# Patient Record
Sex: MALE | Race: WHITE | Employment: OTHER | URBAN - METROPOLITAN AREA
[De-identification: names, ages, dates, MRNs, and addresses within clinical notes are randomized per-mention and may not be internally consistent; named-entity substitution may affect disease eponyms.]

---

## 2022-07-27 ENCOUNTER — OFFICE VISIT (OUTPATIENT)
Dept: ENT CLINIC | Age: 72
End: 2022-07-27
Payer: MEDICARE

## 2022-07-27 VITALS
BODY MASS INDEX: 26.83 KG/M2 | DIASTOLIC BLOOD PRESSURE: 82 MMHG | HEIGHT: 68 IN | SYSTOLIC BLOOD PRESSURE: 134 MMHG | WEIGHT: 177 LBS

## 2022-07-27 DIAGNOSIS — H61.23 EXCESSIVE CERUMEN IN EAR CANAL, BILATERAL: ICD-10-CM

## 2022-07-27 DIAGNOSIS — R09.89 GLOBUS SENSATION: ICD-10-CM

## 2022-07-27 DIAGNOSIS — R05.9 COUGH: ICD-10-CM

## 2022-07-27 DIAGNOSIS — H90.3 SENSORINEURAL HEARING LOSS (SNHL) OF BOTH EARS: Primary | ICD-10-CM

## 2022-07-27 PROCEDURE — 3017F COLORECTAL CA SCREEN DOC REV: CPT | Performed by: STUDENT IN AN ORGANIZED HEALTH CARE EDUCATION/TRAINING PROGRAM

## 2022-07-27 PROCEDURE — 31575 DIAGNOSTIC LARYNGOSCOPY: CPT | Performed by: STUDENT IN AN ORGANIZED HEALTH CARE EDUCATION/TRAINING PROGRAM

## 2022-07-27 PROCEDURE — 99214 OFFICE O/P EST MOD 30 MIN: CPT | Performed by: STUDENT IN AN ORGANIZED HEALTH CARE EDUCATION/TRAINING PROGRAM

## 2022-07-27 PROCEDURE — G8427 DOCREV CUR MEDS BY ELIG CLIN: HCPCS | Performed by: STUDENT IN AN ORGANIZED HEALTH CARE EDUCATION/TRAINING PROGRAM

## 2022-07-27 PROCEDURE — 1123F ACP DISCUSS/DSCN MKR DOCD: CPT | Performed by: STUDENT IN AN ORGANIZED HEALTH CARE EDUCATION/TRAINING PROGRAM

## 2022-07-27 PROCEDURE — 1036F TOBACCO NON-USER: CPT | Performed by: STUDENT IN AN ORGANIZED HEALTH CARE EDUCATION/TRAINING PROGRAM

## 2022-07-27 PROCEDURE — G8417 CALC BMI ABV UP PARAM F/U: HCPCS | Performed by: STUDENT IN AN ORGANIZED HEALTH CARE EDUCATION/TRAINING PROGRAM

## 2022-07-27 ASSESSMENT — ENCOUNTER SYMPTOMS
ABDOMINAL PAIN: 0
COUGH: 0
EYE DISCHARGE: 0

## 2022-07-27 NOTE — PROGRESS NOTES
[facility] Office Note    Patient: Nicole Crowell  MRN: [id number]  : 1950  Gender:  male  Vital Signs: /82   Ht 5' 8\" (1.727 m)   Wt 177 lb (80.3 kg)   BMI 26.91 kg/m²   Date: 2022    CC:   Chief Complaint   Patient presents with    Follow-up     Patient presents today for annual cerumen removal and ear exam . Patient states that he has done well since last OV , he is doing well with Hearing aids. HPI:  Nicole Crowell is a 80 y.o. male with bilateral sensorineural hearing loss. He wears hearing aids. His hearing aids work well for him. He had a respiratory infection about a month ago and since then has some globus sensation and sensation of mucus in his throat and chest that he cannot completely cough up. Past Medical History, Past Surgical History, Family history, Social History, and Medications were all reviewed with the patient today and updated as necessary. No Known Allergies  There is no problem list on file for this patient. Current Outpatient Medications   Medication Sig    valsartan-hydroCHLOROthiazide (DIOVAN-HCT) 80-12.5 MG per tablet Take 1 tablet by mouth daily     No current facility-administered medications for this visit. Past Medical History:   Diagnosis Date    Hypertension      Social History     Tobacco Use    Smoking status: Never    Smokeless tobacco: Never   Substance Use Topics    Alcohol use: Yes     Past Surgical History:   Procedure Laterality Date    OTHER SURGICAL HISTORY      Hernia Repair     History reviewed. No pertinent family history. ROS:    Review of Systems   Constitutional:  Negative for fever. HENT:  Negative for ear discharge and ear pain. Eyes:  Negative for discharge. Respiratory:  Negative for cough. Cardiovascular:  Negative for chest pain. Gastrointestinal:  Negative for abdominal pain. Endocrine: Negative for cold intolerance. Genitourinary:  Negative for difficulty urinating.    Musculoskeletal: Negative for neck pain. Skin:  Negative for rash. Allergic/Immunologic: Negative for environmental allergies. Neurological:  Negative for dizziness. Hematological:  Negative for adenopathy. Psychiatric/Behavioral:  Negative for agitation. PHYSICAL EXAM:    /82   Ht 5' 8\" (1.727 m)   Wt 177 lb (80.3 kg)   BMI 26.91 kg/m²     General: NAD, well-appearing  Neuro: No gross neuro deficits. CN's II-XII intact. No facial weakness. Eyes: EOMI. Pupils reactive. No periorbital edema/ecchymosis. Skin: No facial erythema, rashes or concerning lesions. Nose: No external deviations or saddling. Intranasally, septum is midline without perforations, nasal mucosa appears healthy with no erythema, mucopurulence, or polyps. Mouth: Moist mucus membranes, normal tongue/palate mobility, no concerning mucosal lesions. Oropharynx: clear with no erythema/exudate, no tonsillar hypertrophy. Ears: Normal appearing auricles, no hematomas. EACs clear with no cerumen impaction, healthy canal skin, TM's intact with no perforations or retraction pockets. No middle ear effusions. Neck: Soft, supple, no palpable lateral neck masses. No parotid or submandibular masses. No thyromegaly or palpable thyroid nodules. No surgical scars. Lymphatics: No palpable cervical LAD. Resp/Lungs: No audible stridor or wheezing, CTAB  Heart: RRR  Extremities: No clubbing or cyanosis. PROCEDURE: Flexible laryngoscopy  INDICATIONS: globus, cough  DESCRIPTION: After verbal consent was obtained and a timeout was performed, the flexible scope was advanced down one of the patient's nares into the nasopharynx. The nasal cavity and nasopharynx were clear. The scope was then turned distally down towards the oropharynx. The BOT and vallecula were clear and the epiglottis was normal in appearance. Both aryepiglottic folds were clear and there was a normal appearing glottis w/ healthy TVCs.  No nodules or polyps and the cords were fully mobile. No concerning lesions seen along post-cricoid region or within either piriform sinus. The posterior pharyngeal was clear as well. The scope was then carefully removed. The patient tolerated the procedure well and there were no complications.     CBC  [facility]  07/07/2022  Component      WBC   RBC   Hemoglobin   Hematocrit   MCV   MCH   MCHC   RDW-CV   Platelets   MPV   RDW     Component 07/07/2022 01/03/2022 06/24/2021 12/30/2020 06/23/2020 12/10/2019              WBC 6.8 4.6 4.9 5.3 4.7 4.6 Load older lab results   RBC 4.45 4.46 4.61 4.58 4.58 4.64 Load older lab results   Hemoglobin 14.4 14.7 14.8 14.7 14.7 15.2 Load older lab results   Hematocrit 41.9 42.3 43.4 43.6 43.1 44.5 Load older lab results   MCV 94.2 94.8 94.1 95.1 94.2 95.9 Load older lab results   MCH 32.4 33.0 32.0 32.2 32.0 32.7 Load older lab results   MCHC 34.3 34.8 34.0 33.8 34.0 34.1 Load older lab results   RDW-CV -- -- -- -- 12.7 12.6 Load older lab results   Platelets 694 010 722 172 180 182 Load older lab results   MPV 8.3 8.0 8.0 8.0 8.1 8.1 Load older lab results   RDW 12.5         CHEM TESTS  [facility]  07/07/2022  Component      Sodium   Potassium   Chloride   CO2   BUN   Creatinine   Glucose   Calcium   eGFR   eGFR African American   Albumin   Bilirubin, Total   Bilirubin, Direct   Alkaline Phosphatase   AST   ALT   Protein, Total   Bilirubin, Indirect   Protein Total     Component 07/07/2022 07/07/2022 01/03/2022 06/24/2021 06/24/2021 12/30/2020 06/23/2020 06/23/2020 12/10/2019                 Sodium 141 -- 140 141 -- 140 141 -- 142 Load older lab results   Potassium 4.1 -- 4.0 4.4 -- 4.1 4.4 -- 4.0 Load older lab results   Chloride 103 -- 103 104 -- 104 103 -- 104 Load older lab results   CO2 32 -- 32 32 -- 29 32 -- 31 Load older lab results   BUN 14 -- 14 14 -- 15 16 -- 16 Load older lab results   Creatinine 0.88 -- 0.85 0.95 -- 0.85 0.90 -- 0.90 Load older lab results   Glucose 94 -- 90 91 -- 92 93 -- 90 Load older lab results   Calcium 8.9 -- 9.2 9.4 -- 9.1 9.4 -- 9.0 Load older lab results   eGFR >90                ASSESSMENT and PLAN  80year-old gentleman with bilateral sensorineural hearing loss. He wears hearing aids. Ears were cleaned on exam today. He was scoped due to complaints of globus sensation and cough. Scope exam was clear. I recommended Mucinex for him. He can follow back up with me as needed and should follow-up with audiology periodically for hearing aid reprogramming. ICD-10-CM    1. Sensorineural hearing loss (SNHL) of both ears  H90.3       2. Excessive cerumen in ear canal, bilateral  H61.23       3. Globus sensation  R19.8 LARYNGOSCOPY,FLEX FIBER,DIAGNOSTIC      4.  Cough  R05.9             Chandler Kemp MD  7/27/2022  Electronically signed

## 2022-11-07 ENCOUNTER — OFFICE VISIT (OUTPATIENT)
Dept: ENT CLINIC | Age: 72
End: 2022-11-07
Payer: MEDICARE

## 2022-11-07 DIAGNOSIS — H90.3 SENSORINEURAL HEARING LOSS, BILATERAL: Primary | ICD-10-CM

## 2022-11-07 PROCEDURE — 92557 COMPREHENSIVE HEARING TEST: CPT | Performed by: AUDIOLOGIST

## 2022-11-07 NOTE — PROGRESS NOTES
AUDIOLOGY EVALUATION    Yury Lundberg had Audiometry performed today. The patient reports hearing loss and tinnitus. Results as follows: Audiometry    Test Performed - Comprehensive Audiogram    Type of Loss - Right Ear: abnormal hearing: degree of loss is normal to severe sensorineural hearing loss                           Left Ear: abnormal hearing: degree of loss is normal to moderately severe sensorineural hearing loss     SRT   Measurement Right Ear Left Ear   Value 30 30   Unit dB dB     Discrimination  Measurement Right Ear Left Ear   Value 92% 92%   Unit dB dB     Recommend  Continued use of amplification and annual audios    [address] [facility]  Audiologist

## 2023-09-12 ENCOUNTER — PROCEDURE VISIT (OUTPATIENT)
Dept: AUDIOLOGY | Age: 73
End: 2023-09-12

## 2023-09-12 DIAGNOSIS — Z97.4 USES HEARING AID: Primary | ICD-10-CM

## 2023-09-12 NOTE — PROGRESS NOTES
Patient reports that his right hearing aid battery is only lasting about 7 hours. Cleaned and checked both aids. A listening check revealed that both aids are functioning correctly at this time. Sending the right aid to [facility] for repair under warranty. The patient will be notified once the aid returns from repair.    Gerry Calles CCC-A

## 2023-09-21 ENCOUNTER — TELEPHONE (OUTPATIENT)
Dept: ENT CLINIC | Age: 73
End: 2023-09-21

## 2023-09-21 NOTE — TELEPHONE ENCOUNTER
Patient's hearing aid is back from repair. Right SN: [id number]. Warranty end date: 11/26/24. The aid can be picked up from the  in our [place] office.

## 2023-11-30 ENCOUNTER — PROCEDURE VISIT (OUTPATIENT)
Dept: AUDIOLOGY | Age: 73
End: 2023-11-30

## 2023-11-30 DIAGNOSIS — Z97.4 USES HEARING AID: Primary | ICD-10-CM

## 2023-11-30 NOTE — PROGRESS NOTES
Patient reports that his left hearing aid battery is only lasting about 6 hours. Cleaned and checked both aids. Changed the dome and wax filter on both aids. Cleaned the charging contacts on both aids and the . A listening check revealed that both aids are functioning correctly at this time. Discussed sending the left aid off for repair. The patient decided he did not want to do that at this time. The patient reports no other concerns at this time.    Karime Wade, Gerry CCC-A

## 2024-01-26 ENCOUNTER — PROCEDURE VISIT (OUTPATIENT)
Dept: AUDIOLOGY | Age: 74
End: 2024-01-26

## 2024-01-26 DIAGNOSIS — Z97.4 USES HEARING AID: Primary | ICD-10-CM

## 2024-01-26 NOTE — PROGRESS NOTES
Patient reports that his left hearing aid battery is only lasting about 4 hours. Sending the left aid to [facility] for repair under warranty. The patient will be notified once the aid returns from repair.   Gerry Lemos CCC-A

## 2024-02-05 ENCOUNTER — TELEPHONE (OUTPATIENT)
Dept: ENT CLINIC | Age: 74
End: 2024-02-05

## 2024-02-05 NOTE — TELEPHONE ENCOUNTER
Patient's hearing aid is back from repair. Left SN: [id number]. Warranty end date: 11/26/24. The aid can be picked up from the  in our [place] office.

## 2024-06-24 ENCOUNTER — PROCEDURE VISIT (OUTPATIENT)
Dept: AUDIOLOGY | Age: 74
End: 2024-06-24

## 2024-06-24 DIAGNOSIS — Z97.4 USES HEARING AID: Primary | ICD-10-CM

## 2024-06-24 NOTE — PROGRESS NOTES
Patient reports that his aids are no longer connected to his phone. Cleaned and checked both aids. A listening check revealed that both aids are functioning correctly at this time. Re-paired the aids to his phone. The patient reports no other concerns at this time.   Gerry Lemos CCC-A

## 2024-11-20 ENCOUNTER — PROCEDURE VISIT (OUTPATIENT)
Dept: AUDIOLOGY | Age: 74
End: 2024-11-20

## 2024-11-20 DIAGNOSIS — Z97.4 USES HEARING AID: Primary | ICD-10-CM

## 2024-11-20 NOTE — PROGRESS NOTES
Patient reports needing extra domes and wax filters. His aids are almost out of warranty. Sending both aids and the  to [facility] for a clean and check. The patient was given dome and wax filters. He will be notified once the aids return from repair.   Gerry Lemos CCC-A

## 2024-12-03 ENCOUNTER — TELEPHONE (OUTPATIENT)
Dept: ENT CLINIC | Age: 74
End: 2024-12-03

## 2024-12-03 NOTE — TELEPHONE ENCOUNTER
Patient's hearing aids and  are back from repair. Right SN: [id number]. Left SN: [id number].  SN: [id number]. Aids are now out of warranty. The patient can pick them up from the  in our [place] office.

## 2025-03-06 ENCOUNTER — PROCEDURE VISIT (OUTPATIENT)
Dept: AUDIOLOGY | Age: 75
End: 2025-03-06

## 2025-03-06 DIAGNOSIS — Z97.4 USES HEARING AID: Primary | ICD-10-CM

## 2025-03-06 NOTE — PROGRESS NOTES
Patient reports that his hearing aids are no longer staying in his ears well. Straightened the retention wires on both aids. The patient reports that this feels much better. He reports no other concerns at this time.   Gerry Lemos CCC-A